# Patient Record
Sex: FEMALE | Race: WHITE | NOT HISPANIC OR LATINO | URBAN - METROPOLITAN AREA
[De-identification: names, ages, dates, MRNs, and addresses within clinical notes are randomized per-mention and may not be internally consistent; named-entity substitution may affect disease eponyms.]

---

## 2023-08-07 ENCOUNTER — OUTPATIENT (OUTPATIENT)
Dept: INPATIENT UNIT | Facility: HOSPITAL | Age: 23
LOS: 1 days | Discharge: HOME | End: 2023-08-07
Payer: MEDICAID

## 2023-08-07 VITALS — DIASTOLIC BLOOD PRESSURE: 77 MMHG | HEART RATE: 88 BPM | SYSTOLIC BLOOD PRESSURE: 126 MMHG

## 2023-08-07 VITALS
DIASTOLIC BLOOD PRESSURE: 77 MMHG | RESPIRATION RATE: 20 BRPM | SYSTOLIC BLOOD PRESSURE: 126 MMHG | TEMPERATURE: 98 F | HEART RATE: 88 BPM

## 2023-08-07 DIAGNOSIS — O26.899 OTHER SPECIFIED PREGNANCY RELATED CONDITIONS, UNSPECIFIED TRIMESTER: ICD-10-CM

## 2023-08-07 DIAGNOSIS — Z3A.00 WEEKS OF GESTATION OF PREGNANCY NOT SPECIFIED: ICD-10-CM

## 2023-08-07 PROCEDURE — 99214 OFFICE O/P EST MOD 30 MIN: CPT

## 2023-08-07 NOTE — OB RN TRIAGE NOTE - FALL HARM RISK - FACTORS NURSING JUDGEMENT
Assumed patient care. Patient A&O x 4 on RA. Patient is medical. Patient denies pain at this time. Patient updated on plan of care, verbalizes understanding. Patient has fall precautions in place, call light within reach. Patient has bed in low and locked position. Will continue to monitor.     COVID surge in effect.    No

## 2023-08-07 NOTE — OB PROVIDER TRIAGE NOTE - HISTORY OF PRESENT ILLNESS
21yo  at 39w3d by 1st trimester sono, reports leaking fluid since 0000 on , stopped by 0330. Trish since 1100, irregular, 5/10 in intensity. Denies vaginal bleeding, endorses good FM. Uncomplicated pregnancy, GBS negative.

## 2023-08-07 NOTE — OB PROVIDER TRIAGE NOTE - NSHPPHYSICALEXAM_GEN_ALL_CORE
Vital Signs Last 24 Hrs  T(C): 36.7 (07 Aug 2023 13:52), Max: 36.7 (07 Aug 2023 13:52)  T(F): 98 (07 Aug 2023 13:52), Max: 98 (07 Aug 2023 13:52)  HR: 88 (07 Aug 2023 13:54) (88 - 88)  BP: 126/77 (07 Aug 2023 13:54) (126/77 - 126/77)  RR: 20 (07 Aug 2023 13:52) (20 - 20)    Gen: aaox3  Abd: soft, gravid, nontender, no palpable contractions  EFM: 140/mod/pos accel  [place]: irregular  Speculum: physiologic discharge, no pooling, nitrizine neg, ferning neg  Sono: BPP 8/8, cephalic, fundal placenta, MVP 5cm

## 2023-08-07 NOTE — OB PROVIDER TRIAGE NOTE - NSHPLABSRESULTS_GEN_ALL_CORE
4/25/23      12/6/22  a pos  hbsag NR  RPR nr  hsv 1 igG high  HIV nr  rubella immune    Sonograms  38w6d- LCP, cat 1, BPP 10/10,   34w7d- vtx  11w7d- NT normal

## 2023-08-07 NOTE — OB PROVIDER TRIAGE NOTE - NSOBPROVIDERNOTE_OBGYN_ALL_OB_FT
23yo  at 39w3d, reports leakage of fluid from 0000 to 0330, not ruptured, reassuring maternal and fetal status with BPP 10/10.    -d/c to home  -labor precautions discussed  -fetal kick count  -PO hydration  -return to L&D on 8/10 for scheduled IOL    Dr. Cardenas and Dr. Aj aware.

## 2023-08-10 ENCOUNTER — INPATIENT (INPATIENT)
Facility: HOSPITAL | Age: 23
LOS: 2 days | Discharge: HOME | DRG: 560 | End: 2023-08-13
Attending: OBSTETRICS & GYNECOLOGY | Admitting: OBSTETRICS & GYNECOLOGY
Payer: MEDICAID

## 2023-08-10 VITALS — HEART RATE: 93 BPM | DIASTOLIC BLOOD PRESSURE: 80 MMHG | SYSTOLIC BLOOD PRESSURE: 124 MMHG

## 2023-08-10 DIAGNOSIS — O61.0 FAILED MEDICAL INDUCTION OF LABOR: ICD-10-CM

## 2023-08-10 LAB
BASOPHILS # BLD AUTO: 0.03 K/UL — SIGNIFICANT CHANGE UP (ref 0–0.2)
BASOPHILS NFR BLD AUTO: 0.3 % — SIGNIFICANT CHANGE UP (ref 0–1)
EOSINOPHIL # BLD AUTO: 0.04 K/UL — SIGNIFICANT CHANGE UP (ref 0–0.7)
EOSINOPHIL NFR BLD AUTO: 0.4 % — SIGNIFICANT CHANGE UP (ref 0–8)
HCT VFR BLD CALC: 27.4 % — LOW (ref 37–47)
HGB BLD-MCNC: 8.9 G/DL — LOW (ref 12–16)
HIV 1 & 2 AB SERPL IA.RAPID: SIGNIFICANT CHANGE UP
IMM GRANULOCYTES NFR BLD AUTO: 0.2 % — SIGNIFICANT CHANGE UP (ref 0.1–0.3)
LYMPHOCYTES # BLD AUTO: 2.33 K/UL — SIGNIFICANT CHANGE UP (ref 1.2–3.4)
LYMPHOCYTES # BLD AUTO: 25.3 % — SIGNIFICANT CHANGE UP (ref 20.5–51.1)
MCHC RBC-ENTMCNC: 24.9 PG — LOW (ref 27–31)
MCHC RBC-ENTMCNC: 32.5 G/DL — SIGNIFICANT CHANGE UP (ref 32–37)
MCV RBC AUTO: 76.8 FL — LOW (ref 81–99)
MONOCYTES # BLD AUTO: 0.7 K/UL — HIGH (ref 0.1–0.6)
MONOCYTES NFR BLD AUTO: 7.6 % — SIGNIFICANT CHANGE UP (ref 1.7–9.3)
NEUTROPHILS # BLD AUTO: 6.08 K/UL — SIGNIFICANT CHANGE UP (ref 1.4–6.5)
NEUTROPHILS NFR BLD AUTO: 66.2 % — SIGNIFICANT CHANGE UP (ref 42.2–75.2)
NRBC # BLD: 0 /100 WBCS — SIGNIFICANT CHANGE UP (ref 0–0)
PLATELET # BLD AUTO: 174 K/UL — SIGNIFICANT CHANGE UP (ref 130–400)
PMV BLD: 13.4 FL — HIGH (ref 7.4–10.4)
PRENATAL SYPHILIS TEST: SIGNIFICANT CHANGE UP
RBC # BLD: 3.57 M/UL — LOW (ref 4.2–5.4)
RBC # FLD: 15.4 % — HIGH (ref 11.5–14.5)
WBC # BLD: 9.2 K/UL — SIGNIFICANT CHANGE UP (ref 4.8–10.8)
WBC # FLD AUTO: 9.2 K/UL — SIGNIFICANT CHANGE UP (ref 4.8–10.8)

## 2023-08-10 PROCEDURE — 36415 COLL VENOUS BLD VENIPUNCTURE: CPT

## 2023-08-10 PROCEDURE — 86901 BLOOD TYPING SEROLOGIC RH(D): CPT

## 2023-08-10 PROCEDURE — 86850 RBC ANTIBODY SCREEN: CPT

## 2023-08-10 PROCEDURE — 86900 BLOOD TYPING SEROLOGIC ABO: CPT

## 2023-08-10 PROCEDURE — 85025 COMPLETE CBC W/AUTO DIFF WBC: CPT

## 2023-08-10 PROCEDURE — 86592 SYPHILIS TEST NON-TREP QUAL: CPT

## 2023-08-10 PROCEDURE — 59050 FETAL MONITOR W/REPORT: CPT

## 2023-08-10 PROCEDURE — 86703 HIV-1/HIV-2 1 RESULT ANTBDY: CPT

## 2023-08-10 RX ORDER — OXYTOCIN 10 UNIT/ML
333.33 VIAL (ML) INJECTION
Qty: 20 | Refills: 0 | Status: COMPLETED | OUTPATIENT
Start: 2023-08-10 | End: 2023-08-11

## 2023-08-10 RX ORDER — SODIUM CHLORIDE 9 MG/ML
1000 INJECTION, SOLUTION INTRAVENOUS
Refills: 0 | Status: DISCONTINUED | OUTPATIENT
Start: 2023-08-10 | End: 2023-08-11

## 2023-08-10 RX ORDER — CHLORHEXIDINE GLUCONATE 213 G/1000ML
1 SOLUTION TOPICAL DAILY
Refills: 0 | Status: DISCONTINUED | OUTPATIENT
Start: 2023-08-10 | End: 2023-08-11

## 2023-08-10 RX ORDER — DINOPROSTONE 10 MG/241MG
10 INSERT VAGINAL ONCE
Refills: 0 | Status: COMPLETED | OUTPATIENT
Start: 2023-08-10 | End: 2023-08-10

## 2023-08-10 RX ADMIN — DINOPROSTONE 10 MILLIGRAM(S): 10 INSERT VAGINAL at 22:22

## 2023-08-10 NOTE — OB PROVIDER H&P - NSHPLABSRESULTS_GEN_ALL_CORE
8/6  GBS negative  4/25  gct 100  12/4  GC negative  Trich negative  12/3  Hep B NR  A pos  antibody screen negative  HIV NR  HEP C NR

## 2023-08-10 NOTE — OB RN PATIENT PROFILE - PARENTS VERBALIZED UNDERSTANDING OF THE SAFE SKIN TO SKIN POSITIONING OF THE NEWBORN.
no diplopia/no photophobia/no blurred vision L/no blurred vision R/no pain L/no pain R/no loss of vision L/no loss of vision R
Statement Selected

## 2023-08-10 NOTE — OB PROVIDER H&P - ASSESSMENT
22y  @ 39w6d, GBS negative, IOL at term    -Admit to L & D  -IV hydration, labs  -Continuous EFM & TOCO monitoring  -Clear Liquid diet  -Pain Management PRN      Dr. Aj to be made aware and Dr. Rinaldi aware.  22y  @ 39w6d, GBS negative, IOL at term    -Admit to L & D  -IV hydration, labs  -Continuous EFM & TOCO monitoring  -Clear Liquid diet  -Pain Management PRN  -IOL with cervidil      Dr. Aj to be made aware and Dr. Rinaldi aware.

## 2023-08-10 NOTE — OB PROVIDER H&P - HISTORY OF PRESENT ILLNESS
21yo  at 39w6d presents to L&D for scheduled, elective induction. Denies ctx, LOF, VB and endorses good FM. GBS negative. Rh positive.  23yo  at 39w6d presents to L&D for scheduled, elective induction. Denies ctx, LOF, VB and endorses good FM. GBS negative. Rh positive.

## 2023-08-10 NOTE — OB RN PATIENT PROFILE - BIRTH SEX
External genitalia is normal. Normal cremasteric b/l, no testicular erythema or swelling, no tenderness.
Female

## 2023-08-10 NOTE — OB PROVIDER H&P - NSHPPHYSICALEXAM_GEN_ALL_CORE
Physical Exam  Vital Signs Last 24 Hrs  T(F): 98.1 (10 Aug 2023 21:27), Max: 98.1 (10 Aug 2023 21:27)  HR: 93 (10 Aug 2023 21:27) (93 - 93)  BP: 124/80 (10 Aug 2023 21:27) (124/80 - 124/80)  RR: 18 (10 Aug 2023 21:27) (18 - 18)    Gen: NAD, AOx3  Abdomen: soft, gravid, nontender, no palpable contractions    EFM: 150bpm/ moderate variability/ +accels  [place]: irregularly   SVE: .5/0/-3    BSS: vertex Physical Exam  Vital Signs Last 24 Hrs  T(F): 98.1 (10 Aug 2023 21:27), Max: 98.1 (10 Aug 2023 21:27)  HR: 93 (10 Aug 2023 21:27) (93 - 93)  BP: 124/80 (10 Aug 2023 21:27) (124/80 - 124/80)  RR: 18 (10 Aug 2023 21:27) (18 - 18)    Gen: NAD, AOx3  Abdomen: soft, gravid, nontender, no palpable contractions    EFM: 150bpm/ moderate variability/ +accels  [place]: irregularly   SVE: .5/0/-3    BSS: vertex Physical Exam  Vital Signs Last 24 Hrs  T(F): 98.1 (10 Aug 2023 21:27), Max: 98.1 (10 Aug 2023 21:27)  HR: 93 (10 Aug 2023 21:27) (93 - 93)  BP: 124/80 (10 Aug 2023 21:27) (124/80 - 124/80)  RR: 18 (10 Aug 2023 21:27) (18 - 18)    Gen: NAD, AOx3  Abdomen: soft, gravid, nontender, no palpable contractions    EFM: 150bpm/ moderate variability/ +accels  [place]: irregularly   SVE: .5/0/-3    BSS: vertex Physical Exam  Vital Signs Last 24 Hrs  T(F): 98.1 (10 Aug 2023 21:27), Max: 98.1 (10 Aug 2023 21:27)  HR: 93 (10 Aug 2023 21:27) (93 - 93)  BP: 124/80 (10 Aug 2023 21:27) (124/80 - 124/80)  RR: 18 (10 Aug 2023 21:27) (18 - 18)    Gen: NAD, AOx3  Abdomen: soft, gravid, nontender, no palpable contractions    EFM: 150bpm/ moderate variability/ +accels  [place]: irregularly   SVE: 1/0/-3 Exam by Dr. Rinaldi     BSS: vertex Physical Exam  Vital Signs Last 24 Hrs  T(F): 98.1 (10 Aug 2023 21:27), Max: 98.1 (10 Aug 2023 21:27)  HR: 93 (10 Aug 2023 21:27) (93 - 93)  BP: 124/80 (10 Aug 2023 21:27) (124/80 - 124/80)  RR: 18 (10 Aug 2023 21:27) (18 - 18)    Gen: NAD, AOx3  Abdomen: soft, gravid, nontender, no palpable contractions    EFM: 150bpm/ moderate variability/ +accels  [place]: irregularly   SVE: 1/0/-3 Exam by Dr. Rinaldi     BSS: vertex Physical Exam  Vital Signs Last 24 Hrs  T(F): 98.1 (10 Aug 2023 21:27), Max: 98.1 (10 Aug 2023 21:27)  HR: 93 (10 Aug 2023 21:27) (93 - 93)  BP: 124/80 (10 Aug 2023 21:27) (124/80 - 124/80)  RR: 18 (10 Aug 2023 21:27) (18 - 18)    Gen: NAD, AOx3  Abdomen: soft, gravid, nontender, no palpable contractions    EFM: 150bpm/ moderate variability/ +accels  [place]: irregularly   SVE: 1/0/-3 Exam by Dr. Rinaldi     BSS: vertex

## 2023-08-11 PROBLEM — N12 TUBULO-INTERSTITIAL NEPHRITIS, NOT SPECIFIED AS ACUTE OR CHRONIC: Chronic | Status: ACTIVE | Noted: 2023-08-07

## 2023-08-11 LAB — ABO RH CONFIRMATION: SIGNIFICANT CHANGE UP

## 2023-08-11 RX ORDER — FENTANYL/BUPIVACAINE/NS/PF 2MCG/ML-.1
250 PLASTIC BAG, INJECTION (ML) INJECTION
Refills: 0 | Status: DISCONTINUED | OUTPATIENT
Start: 2023-08-11 | End: 2023-08-11

## 2023-08-11 RX ORDER — SIMETHICONE 80 MG/1
80 TABLET, CHEWABLE ORAL EVERY 4 HOURS
Refills: 0 | Status: DISCONTINUED | OUTPATIENT
Start: 2023-08-11 | End: 2023-08-13

## 2023-08-11 RX ORDER — KETOROLAC TROMETHAMINE 30 MG/ML
30 SYRINGE (ML) INJECTION ONCE
Refills: 0 | Status: DISCONTINUED | OUTPATIENT
Start: 2023-08-11 | End: 2023-08-11

## 2023-08-11 RX ORDER — ONDANSETRON 8 MG/1
4 TABLET, FILM COATED ORAL EVERY 6 HOURS
Refills: 0 | Status: DISCONTINUED | OUTPATIENT
Start: 2023-08-11 | End: 2023-08-13

## 2023-08-11 RX ORDER — DEXAMETHASONE 0.5 MG/5ML
4 ELIXIR ORAL EVERY 6 HOURS
Refills: 0 | Status: DISCONTINUED | OUTPATIENT
Start: 2023-08-11 | End: 2023-08-13

## 2023-08-11 RX ORDER — MAGNESIUM HYDROXIDE 400 MG/1
30 TABLET, CHEWABLE ORAL
Refills: 0 | Status: DISCONTINUED | OUTPATIENT
Start: 2023-08-11 | End: 2023-08-13

## 2023-08-11 RX ORDER — OXYCODONE HYDROCHLORIDE 5 MG/1
5 TABLET ORAL ONCE
Refills: 0 | Status: DISCONTINUED | OUTPATIENT
Start: 2023-08-11 | End: 2023-08-13

## 2023-08-11 RX ORDER — BENZOCAINE 10 %
1 GEL (GRAM) MUCOUS MEMBRANE EVERY 6 HOURS
Refills: 0 | Status: DISCONTINUED | OUTPATIENT
Start: 2023-08-11 | End: 2023-08-13

## 2023-08-11 RX ORDER — LANOLIN
1 OINTMENT (GRAM) TOPICAL EVERY 6 HOURS
Refills: 0 | Status: DISCONTINUED | OUTPATIENT
Start: 2023-08-11 | End: 2023-08-13

## 2023-08-11 RX ORDER — OXYTOCIN 10 UNIT/ML
2 VIAL (ML) INJECTION
Qty: 30 | Refills: 0 | Status: DISCONTINUED | OUTPATIENT
Start: 2023-08-11 | End: 2023-08-11

## 2023-08-11 RX ORDER — NALOXONE HYDROCHLORIDE 4 MG/.1ML
0.1 SPRAY NASAL
Refills: 0 | Status: DISCONTINUED | OUTPATIENT
Start: 2023-08-11 | End: 2023-08-13

## 2023-08-11 RX ORDER — DIPHENHYDRAMINE HCL 50 MG
25 CAPSULE ORAL ONCE
Refills: 0 | Status: COMPLETED | OUTPATIENT
Start: 2023-08-11 | End: 2023-08-11

## 2023-08-11 RX ORDER — SODIUM CHLORIDE 9 MG/ML
3 INJECTION INTRAMUSCULAR; INTRAVENOUS; SUBCUTANEOUS EVERY 8 HOURS
Refills: 0 | Status: DISCONTINUED | OUTPATIENT
Start: 2023-08-11 | End: 2023-08-13

## 2023-08-11 RX ORDER — DIPHENHYDRAMINE HCL 50 MG
25 CAPSULE ORAL EVERY 6 HOURS
Refills: 0 | Status: DISCONTINUED | OUTPATIENT
Start: 2023-08-11 | End: 2023-08-13

## 2023-08-11 RX ORDER — IBUPROFEN 200 MG
600 TABLET ORAL EVERY 6 HOURS
Refills: 0 | Status: COMPLETED | OUTPATIENT
Start: 2023-08-11 | End: 2024-07-09

## 2023-08-11 RX ORDER — OXYCODONE HYDROCHLORIDE 5 MG/1
5 TABLET ORAL
Refills: 0 | Status: DISCONTINUED | OUTPATIENT
Start: 2023-08-11 | End: 2023-08-13

## 2023-08-11 RX ORDER — ACETAMINOPHEN 500 MG
975 TABLET ORAL
Refills: 0 | Status: DISCONTINUED | OUTPATIENT
Start: 2023-08-11 | End: 2023-08-13

## 2023-08-11 RX ORDER — AER TRAVELER 0.5 G/1
1 SOLUTION RECTAL; TOPICAL EVERY 4 HOURS
Refills: 0 | Status: DISCONTINUED | OUTPATIENT
Start: 2023-08-11 | End: 2023-08-13

## 2023-08-11 RX ORDER — TETANUS TOXOID, REDUCED DIPHTHERIA TOXOID AND ACELLULAR PERTUSSIS VACCINE, ADSORBED 5; 2.5; 8; 8; 2.5 [IU]/.5ML; [IU]/.5ML; UG/.5ML; UG/.5ML; UG/.5ML
0.5 SUSPENSION INTRAMUSCULAR ONCE
Refills: 0 | Status: DISCONTINUED | OUTPATIENT
Start: 2023-08-11 | End: 2023-08-13

## 2023-08-11 RX ORDER — DIBUCAINE 1 %
1 OINTMENT (GRAM) RECTAL EVERY 6 HOURS
Refills: 0 | Status: DISCONTINUED | OUTPATIENT
Start: 2023-08-11 | End: 2023-08-13

## 2023-08-11 RX ADMIN — Medication 1000 MILLIUNIT(S)/MIN: at 22:09

## 2023-08-11 RX ADMIN — Medication 2 MILLIUNIT(S)/MIN: at 14:29

## 2023-08-11 RX ADMIN — Medication 25 MILLIGRAM(S): at 19:39

## 2023-08-11 RX ADMIN — SODIUM CHLORIDE 125 MILLILITER(S): 9 INJECTION, SOLUTION INTRAVENOUS at 14:29

## 2023-08-11 NOTE — OB RN DELIVERY SUMMARY - NSSELHIDDEN_OBGYN_ALL_OB_FT
[NS_DeliveryAttending1_OBGYN_ALL_OB_FT:[id number]],[NS_DeliveryRN_OBGYN_ALL_OB_FT:[id number]],[NS_CirculateRN2_OBGYN_ALL_OB_FT:[id number]=]
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
## 2023-08-11 NOTE — PROGRESS NOTE ADULT - SUBJECTIVE AND OBJECTIVE BOX
PGY1 Note    Patient seen at bedside for labor progression. No complaints at the moment.    T(F): 98.1 (08-10 @ 21:27), Max: 98.1 (08-10 @ 21:27)  HR: 62 (08-11 @ 00:58)  BP: 113/65 (08-11 @ 00:58) (113/65 - 124/80)  RR: 18 (08-10 @ 21:27)  EFM: 135bpm/ moderate variability +accels  TOCO: irregularly   SVE: 1/0/-3 (last exam by Dr. Rinaldi)    Medications:    dinoprostone Insert: 10 (08-10 @ 22:22)      Labs:                        8.9    9.20  )-----------( 174      ( 10 Aug 2023 21:57 )             27.4           Prenatal Syphilis Test: Nonreact (08-10 @ 21:57)  Rapid HIV-1/2 Antibody: Nonreact (08-10 @ 21:57)  Antibody Screen: NEG (08-10-23 @ 21:57)

## 2023-08-11 NOTE — CHART NOTE - NSCHARTNOTEFT_GEN_A_CORE
PGY4 note    Patient seen at bedside for prolonged deceleration lasting 5-6mins, to a jaci of 60bpm, cervidil removed at this time, SVE remains unchanged at 1/0/-3, repositioned to right lateral, IV bolus given, oxygen administered with spontaneous resolution.    Dr. Aj aware
PGY 1 Note    Pt seen and evaluated at bedside. Exam is unchanged after cervidil removal and observation. Cervical ripening balloon placed at 0430.       Dr. Rinaldi at bedside. Dr. Aj aware.

## 2023-08-11 NOTE — OB PROVIDER DELIVERY SUMMARY - NSLOWPPHRISK_OBGYN_A_OB
No previous uterine incision/[place] Pregnancy/Less than or equal to 4 previous vaginal births/No known bleeding disorder/No history of postpartum hemorrhage/No other PPH risks indicated

## 2023-08-11 NOTE — PROGRESS NOTE ADULT - SUBJECTIVE AND OBJECTIVE BOX
Patient seen at bedside, resting comfortably. No complaints at this time.    T(C): 37.0 (23 @ 11:33), Max: 37.0 (23 @ 11:33)  HR: 59 (23 @ 12:53) (52 - 93)  BP: 118/76 (23 @ 12:53) (99/58 - 140/94)  RR: 18 (23 @ 11:33) (18 - 18)  SpO2: 96% (23 @ 12:50) (90% - 98%)    EFM: 140 bpm/mod/+accels  TOCO: q 3 mins  SVE: 5/60/-2, balloon removed    Meds:chlorhexidine 2% Cloths 1 Application(s) Topical daily  dexAMETHasone  Injectable 4 milliGRAM(s) IV Push every 6 hours PRN  fentanyl (2 MICROgram(s)/mL) + bupivacaine 0.0625%  in 0.9% Sodium Chloride PCEA 250 milliLiter(s) Epidural PCA Continuous  lactated ringers. 1000 milliLiter(s) IV Continuous <Continuous>  naloxone Injectable 0.1 milliGRAM(s) IV Push every 3 minutes PRN  ondansetron Injectable 4 milliGRAM(s) IV Push every 6 hours PRN  oxytocin Infusion 333.333 milliUNIT(s)/Min IV Continuous <Continuous>      Labs:      A/P: +  22y  at 40w0d, GBS negative, IOL at term, s/p cervidil, s/p Balloon.    Plan:  Start Pitocin  Continue EFM/TOCO  Continue IV hydration  Continue Clear Liquid diet  Epidural in Place  Pain management PRN    Dr. Aj aware Patient seen at bedside, resting comfortably. No complaints at this time.    T(C): 37.0 (23 @ 11:33), Max: 37.0 (23 @ 11:33)  HR: 59 (23 @ 12:53) (52 - 93)  BP: 118/76 (23 @ 12:53) (99/58 - 140/94)  RR: 18 (23 @ 11:33) (18 - 18)  SpO2: 96% (23 @ 12:50) (90% - 98%)    EFM: 140 bpm/mod/+accels  TOCO: q 3 mins  SVE: 5/60/-2, balloon removed    Meds:chlorhexidine 2% Cloths 1 Application(s) Topical daily  dexAMETHasone  Injectable 4 milliGRAM(s) IV Push every 6 hours PRN  fentanyl (2 MICROgram(s)/mL) + bupivacaine 0.0625%  in 0.9% Sodium Chloride PCEA 250 milliLiter(s) Epidural PCA Continuous  lactated ringers. 1000 milliLiter(s) IV Continuous <Continuous>  naloxone Injectable 0.1 milliGRAM(s) IV Push every 3 minutes PRN  ondansetron Injectable 4 milliGRAM(s) IV Push every 6 hours PRN  oxytocin Infusion 333.333 milliUNIT(s)/Min IV Continuous <Continuous>      Labs: none new      A/P: +  22y  at 40w0d, GBS negative, IOL at term, s/p cervidil, s/p Balloon.    Plan:  Start Pitocin  Continue EFM/TOCO  Continue IV hydration  Continue Clear Liquid diet  Epidural in Place  Pain management PRN    Dr. Aj aware

## 2023-08-11 NOTE — PROGRESS NOTE ADULT - SUBJECTIVE AND OBJECTIVE BOX
PGY1 Note    Patient seen at bedside for labor progression. No complaints at the moment.    T(F): 98.06 (08-11 @ 17:45), Max: 98.6 (08-11 @ 11:33)  HR: 79 (08-11 @ 19:06)  BP: 118/78 (08-11 @ 19:06) (96/50 - 140/94)  RR: 18 (08-11 @ 17:45)  EFM: 130bpm/ moderate variability +accels  TOCO: q1-2  SVE: last exam 5/60/-2     Medications:  dinoprostone Insert: 10 (08-10 @ 22:22)  lactated ringers.: 125 (08-10 @ 21:44)  oxytocin Infusion.: 2 (08-11 @ 13:13)      Labs:                        8.9    9.20  )-----------( 174      ( 10 Aug 2023 21:57 )             27.4           Prenatal Syphilis Test: Nonreact (08-10 @ 21:57)  Rapid HIV-1/2 Antibody: Nonreact (08-10 @ 21:57)  Antibody Screen: NEG (08-10-23 @ 21:57)             PGY1 Note    Patient seen at bedside for labor progression. Complaining of mild itching s/p epidural. No other complaints at the moment.    T(F): 98.06 (08-11 @ 17:45), Max: 98.6 (08-11 @ 11:33)  HR: 79 (08-11 @ 19:06)  BP: 118/78 (08-11 @ 19:06) (96/50 - 140/94)  RR: 18 (08-11 @ 17:45)  EFM: 130bpm/ moderate variability +accels  TOCO: q1-2  SVE: last exam 5/60/-2     Medications:  dinoprostone Insert: 10 (08-10 @ 22:22)  lactated ringers.: 125 (08-10 @ 21:44)  oxytocin Infusion.: 2 (08-11 @ 13:13)      Labs:                        8.9    9.20  )-----------( 174      ( 10 Aug 2023 21:57 )             27.4           Prenatal Syphilis Test: Nonreact (08-10 @ 21:57)  Rapid HIV-1/2 Antibody: Nonreact (08-10 @ 21:57)  Antibody Screen: NEG (08-10-23 @ 21:57)

## 2023-08-11 NOTE — PROGRESS NOTE ADULT - SUBJECTIVE AND OBJECTIVE BOX
Patient seen at bedside, resting comfortably. No complaints at this time.    T(C): 36.3 (23 @ 16:43), Max: 37.0 (23 @ 11:33)  HR: 58 (23 @ 17:15) (52 - 99)  BP: 104/52 (23 @ 17:04) (96/50 - 140/94)  RR: 18 (23 @ 11:33) (18 - 18)  SpO2: 98% (23 @ 17:10) (90% - 99%)    EFM: 145 bpm/mod/+accels  TOCO: q 4 mins  SVE: 5/60/-2, intact    Meds:chlorhexidine 2% Cloths 1 Application(s) Topical daily  dexAMETHasone  Injectable 4 milliGRAM(s) IV Push every 6 hours PRN  fentanyl (2 MICROgram(s)/mL) + bupivacaine 0.0625%  in 0.9% Sodium Chloride PCEA 250 milliLiter(s) Epidural PCA Continuous  lactated ringers. 1000 milliLiter(s) IV Continuous <Continuous>  naloxone Injectable 0.1 milliGRAM(s) IV Push every 3 minutes PRN  ondansetron Injectable 4 milliGRAM(s) IV Push every 6 hours PRN  oxytocin Infusion 333.333 milliUNIT(s)/Min IV Continuous <Continuous>  oxytocin Infusion. 2 milliUNIT(s)/Min IV Continuous <Continuous>      Labs: none new      A/P: +  22y  at 40w0d, GBS negative, IOL at term, s/p cervidil, s/p Balloon, on Pitocin.    Plan:  Call for Topoff  Continue EFM/TOCO  Continue IV hydration  Continue Clear Liquid diet  Continue Pitocin. Currently on 6 mu.  Epidural in Place  Pain management PRN    Dr. Aj aware

## 2023-08-11 NOTE — OB PROVIDER DELIVERY SUMMARY - NSSELHIDDEN_OBGYN_ALL_OB_FT
[NS_DeliveryAttending1_OBGYN_ALL_OB_FT:[id number]],[NS_DeliveryRN_OBGYN_ALL_OB_FT:[id number]],[NS_CirculateRN2_OBGYN_ALL_OB_FT:[id number]=]

## 2023-08-12 LAB
BASOPHILS # BLD AUTO: 0.04 K/UL — SIGNIFICANT CHANGE UP (ref 0–0.2)
BASOPHILS NFR BLD AUTO: 0.4 % — SIGNIFICANT CHANGE UP (ref 0–1)
EOSINOPHIL # BLD AUTO: 0.08 K/UL — SIGNIFICANT CHANGE UP (ref 0–0.7)
EOSINOPHIL NFR BLD AUTO: 0.8 % — SIGNIFICANT CHANGE UP (ref 0–8)
HCT VFR BLD CALC: 23.5 % — LOW (ref 37–47)
HGB BLD-MCNC: 7.5 G/DL — LOW (ref 12–16)
IMM GRANULOCYTES NFR BLD AUTO: 0.5 % — HIGH (ref 0.1–0.3)
LYMPHOCYTES # BLD AUTO: 2.32 K/UL — SIGNIFICANT CHANGE UP (ref 1.2–3.4)
LYMPHOCYTES # BLD AUTO: 23.2 % — SIGNIFICANT CHANGE UP (ref 20.5–51.1)
MCHC RBC-ENTMCNC: 25 PG — LOW (ref 27–31)
MCHC RBC-ENTMCNC: 31.9 G/DL — LOW (ref 32–37)
MCV RBC AUTO: 78.3 FL — LOW (ref 81–99)
MONOCYTES # BLD AUTO: 0.83 K/UL — HIGH (ref 0.1–0.6)
MONOCYTES NFR BLD AUTO: 8.3 % — SIGNIFICANT CHANGE UP (ref 1.7–9.3)
NEUTROPHILS # BLD AUTO: 6.66 K/UL — HIGH (ref 1.4–6.5)
NEUTROPHILS NFR BLD AUTO: 66.8 % — SIGNIFICANT CHANGE UP (ref 42.2–75.2)
NRBC # BLD: 0 /100 WBCS — SIGNIFICANT CHANGE UP (ref 0–0)
PLATELET # BLD AUTO: 135 K/UL — SIGNIFICANT CHANGE UP (ref 130–400)
PMV BLD: 13.7 FL — HIGH (ref 7.4–10.4)
RBC # BLD: 3 M/UL — LOW (ref 4.2–5.4)
RBC # FLD: 15.6 % — HIGH (ref 11.5–14.5)
WBC # BLD: 9.98 K/UL — SIGNIFICANT CHANGE UP (ref 4.8–10.8)
WBC # FLD AUTO: 9.98 K/UL — SIGNIFICANT CHANGE UP (ref 4.8–10.8)

## 2023-08-12 RX ORDER — SENNA PLUS 8.6 MG/1
1 TABLET ORAL DAILY
Refills: 0 | Status: DISCONTINUED | OUTPATIENT
Start: 2023-08-12 | End: 2023-08-13

## 2023-08-12 RX ORDER — FERROUS SULFATE 325(65) MG
325 TABLET ORAL
Refills: 0 | Status: DISCONTINUED | OUTPATIENT
Start: 2023-08-12 | End: 2023-08-13

## 2023-08-12 RX ORDER — IBUPROFEN 200 MG
600 TABLET ORAL EVERY 6 HOURS
Refills: 0 | Status: DISCONTINUED | OUTPATIENT
Start: 2023-08-12 | End: 2023-08-13

## 2023-08-12 RX ADMIN — Medication 600 MILLIGRAM(S): at 13:58

## 2023-08-12 RX ADMIN — Medication 600 MILLIGRAM(S): at 06:17

## 2023-08-12 RX ADMIN — SODIUM CHLORIDE 3 MILLILITER(S): 9 INJECTION INTRAMUSCULAR; INTRAVENOUS; SUBCUTANEOUS at 14:05

## 2023-08-12 RX ADMIN — Medication 1 TABLET(S): at 13:57

## 2023-08-12 RX ADMIN — SODIUM CHLORIDE 3 MILLILITER(S): 9 INJECTION INTRAMUSCULAR; INTRAVENOUS; SUBCUTANEOUS at 06:18

## 2023-08-12 RX ADMIN — Medication 975 MILLIGRAM(S): at 02:19

## 2023-08-12 RX ADMIN — Medication 975 MILLIGRAM(S): at 09:55

## 2023-08-12 RX ADMIN — SODIUM CHLORIDE 3 MILLILITER(S): 9 INJECTION INTRAMUSCULAR; INTRAVENOUS; SUBCUTANEOUS at 21:55

## 2023-08-12 RX ADMIN — Medication 325 MILLIGRAM(S): at 18:46

## 2023-08-12 RX ADMIN — Medication 600 MILLIGRAM(S): at 07:28

## 2023-08-12 RX ADMIN — Medication 975 MILLIGRAM(S): at 09:25

## 2023-08-12 RX ADMIN — Medication 600 MILLIGRAM(S): at 19:30

## 2023-08-12 RX ADMIN — Medication 600 MILLIGRAM(S): at 18:46

## 2023-08-12 NOTE — PROGRESS NOTE ADULT - ASSESSMENT
S/P  PPD#1, recovering well  Plan for discharge home at am  Rx Iron 325 mg PO tid  Rx Colace 100 mg PO bid
A/P:   22y  at 40w0d, GBS negative, in labor progressing well.  -Continue current management   -Pain management prn  -Continuous EFM/toco  -IV fluid hydration, CLD  -Cervidil at 2215  -Reevaluate     Dr. Aj and Dr. Rinaldi to be made aware. 
A/P:   22y  at 40w0d, GBS negative, IOL at term, s/p cervidil, s/p Balloon, on Pitocin.  -Continue current management, pitocin currently running at 6mu/min  -Pain management prn  -Continuous EFM/toco  -IV fluid hydration, CLD  -Reevaluate     Dr. Aj and Dr. Rinaldi to be made aware.

## 2023-08-12 NOTE — PROGRESS NOTE ADULT - SUBJECTIVE AND OBJECTIVE BOX
S/P  PPD #1 no complains. Denies SOB, chest pain, dizziness. Ambulates wnl. Voids wnl.   Lochia mild  Vitals sighns stable, afebrile  CVS S1+S2 RRR  Lungs CTA b/l  LE: DTR +2 b/l, negative Homman's, no edema  Abd Soft, NT, ND, +BS  Uterine fundus below the level of umbilicus

## 2023-08-13 VITALS
RESPIRATION RATE: 18 BRPM | HEART RATE: 81 BPM | DIASTOLIC BLOOD PRESSURE: 61 MMHG | SYSTOLIC BLOOD PRESSURE: 107 MMHG | TEMPERATURE: 98 F

## 2023-08-13 LAB
BASOPHILS # BLD AUTO: 0.04 K/UL — SIGNIFICANT CHANGE UP (ref 0–0.2)
BASOPHILS NFR BLD AUTO: 0.4 % — SIGNIFICANT CHANGE UP (ref 0–1)
EOSINOPHIL # BLD AUTO: 0.08 K/UL — SIGNIFICANT CHANGE UP (ref 0–0.7)
EOSINOPHIL NFR BLD AUTO: 0.7 % — SIGNIFICANT CHANGE UP (ref 0–8)
HCT VFR BLD CALC: 26.2 % — LOW (ref 37–47)
HGB BLD-MCNC: 8.2 G/DL — LOW (ref 12–16)
IMM GRANULOCYTES NFR BLD AUTO: 0.2 % — SIGNIFICANT CHANGE UP (ref 0.1–0.3)
LYMPHOCYTES # BLD AUTO: 2.98 K/UL — SIGNIFICANT CHANGE UP (ref 1.2–3.4)
LYMPHOCYTES # BLD AUTO: 26.8 % — SIGNIFICANT CHANGE UP (ref 20.5–51.1)
MCHC RBC-ENTMCNC: 24.7 PG — LOW (ref 27–31)
MCHC RBC-ENTMCNC: 31.3 G/DL — LOW (ref 32–37)
MCV RBC AUTO: 78.9 FL — LOW (ref 81–99)
MONOCYTES # BLD AUTO: 0.66 K/UL — HIGH (ref 0.1–0.6)
MONOCYTES NFR BLD AUTO: 5.9 % — SIGNIFICANT CHANGE UP (ref 1.7–9.3)
NEUTROPHILS # BLD AUTO: 7.35 K/UL — HIGH (ref 1.4–6.5)
NEUTROPHILS NFR BLD AUTO: 66 % — SIGNIFICANT CHANGE UP (ref 42.2–75.2)
NRBC # BLD: 0 /100 WBCS — SIGNIFICANT CHANGE UP (ref 0–0)
PLATELET # BLD AUTO: 154 K/UL — SIGNIFICANT CHANGE UP (ref 130–400)
PMV BLD: 13.1 FL — HIGH (ref 7.4–10.4)
RBC # BLD: 3.32 M/UL — LOW (ref 4.2–5.4)
RBC # FLD: 15.9 % — HIGH (ref 11.5–14.5)
WBC # BLD: 11.13 K/UL — HIGH (ref 4.8–10.8)
WBC # FLD AUTO: 11.13 K/UL — HIGH (ref 4.8–10.8)

## 2023-08-13 RX ORDER — ACETAMINOPHEN 500 MG
2 TABLET ORAL
Qty: 0 | Refills: 0 | DISCHARGE
Start: 2023-08-13

## 2023-08-13 RX ORDER — IBUPROFEN 200 MG
1 TABLET ORAL
Qty: 0 | Refills: 0 | DISCHARGE
Start: 2023-08-13

## 2023-08-13 RX ADMIN — Medication 1 TABLET(S): at 12:14

## 2023-08-13 RX ADMIN — Medication 600 MILLIGRAM(S): at 07:00

## 2023-08-13 RX ADMIN — SODIUM CHLORIDE 3 MILLILITER(S): 9 INJECTION INTRAMUSCULAR; INTRAVENOUS; SUBCUTANEOUS at 13:33

## 2023-08-13 RX ADMIN — Medication 600 MILLIGRAM(S): at 06:17

## 2023-08-13 RX ADMIN — Medication 975 MILLIGRAM(S): at 09:04

## 2023-08-13 RX ADMIN — Medication 975 MILLIGRAM(S): at 10:00

## 2023-08-13 RX ADMIN — SODIUM CHLORIDE 3 MILLILITER(S): 9 INJECTION INTRAMUSCULAR; INTRAVENOUS; SUBCUTANEOUS at 06:15

## 2023-08-13 RX ADMIN — Medication 325 MILLIGRAM(S): at 06:17

## 2023-08-13 NOTE — DISCHARGE NOTE OB - NS MD DC FALL RISK RISK
For information on Fall & Injury Prevention, visit: [email or web address] OR  [email or web address] OR  https://www.cdc.gov/steadi/patient.html

## 2023-08-13 NOTE — DISCHARGE NOTE OB - MEDICATION SUMMARY - MEDICATIONS TO TAKE
I will START or STAY ON the medications listed below when I get home from the hospital:    ibuprofen 600 mg oral tablet  -- 1 tab(s) by mouth every 6 hours  -- Indication: For pain    acetaminophen 325 mg oral tablet  -- 2 tab(s) by mouth every 6 hours  -- Indication: For pain    Prenatal Multivitamins with Folic Acid 1 mg oral tablet  -- 1 tab(s) by mouth once a day  -- Indication: For if breastfeeding

## 2023-08-13 NOTE — DISCHARGE NOTE OB - CARE PROVIDER_API CALL
Jai Aj  Obstetrics and Gynecology  [address] [place] [address]  Phone: [phone]  Fax: [phone]  Follow Up Time:

## 2023-08-13 NOTE — DISCHARGE NOTE OB - PATIENT PORTAL LINK FT
You can access the FollowMyHealth Patient Portal offered by [facility] by registering at the following website: [email or web address]. By joining [employer]’s FollowMyHealth portal, you will also be able to view your health information using other applications (apps) compatible with our system.
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
## 2023-08-17 DIAGNOSIS — Z3A.39 39 WEEKS GESTATION OF PREGNANCY: ICD-10-CM

## 2023-08-17 DIAGNOSIS — Z28.09 IMMUNIZATION NOT CARRIED OUT BECAUSE OF OTHER CONTRAINDICATION: ICD-10-CM

## 2023-09-28 NOTE — OB RN PATIENT PROFILE - EDUCATION PROVIDED ON ASSESSMENT OF INFANT "FEEDING CUES" AND THE IMPORTANCE OF FEEDING "ON CUE" / "BABY-LED" FEEDINGS
Patient will develop healthy coping strategies, improving overall health, finding ways to increase exercise, identify and create short term goals, utilize grounding and breathing techniques  and increase self care practices.   
Statement Selected

## 2025-01-11 NOTE — OB RN PATIENT PROFILE - FUNCTIONAL ASSESSMENT - DAILY ACTIVITY SCORE.
Writer called to patient room to assess wrist area. IV infiltrated. Large lump on underside of wrist. IV taken out and new access established. Ice pack given for large lump. No pain to the area per patient.    24